# Patient Record
Sex: FEMALE | Race: WHITE | Employment: PART TIME | URBAN - NONMETROPOLITAN AREA
[De-identification: names, ages, dates, MRNs, and addresses within clinical notes are randomized per-mention and may not be internally consistent; named-entity substitution may affect disease eponyms.]

---

## 2023-12-14 ENCOUNTER — OFFICE VISIT (OUTPATIENT)
Dept: UROLOGY | Age: 63
End: 2023-12-14
Payer: COMMERCIAL

## 2023-12-14 VITALS — WEIGHT: 190 LBS | HEIGHT: 63 IN | RESPIRATION RATE: 16 BRPM | BODY MASS INDEX: 33.66 KG/M2

## 2023-12-14 DIAGNOSIS — N20.0 KIDNEY STONES: Primary | ICD-10-CM

## 2023-12-14 LAB
BILIRUBIN URINE: NEGATIVE
BLOOD URINE, POC: NEGATIVE
CHARACTER, URINE: CLEAR
COLOR, URINE: ABNORMAL
GLUCOSE URINE: NEGATIVE MG/DL
KETONES, URINE: NEGATIVE
LEUKOCYTE CLUMPS, URINE: NEGATIVE
NITRITE, URINE: NEGATIVE
PH, URINE: 5.5 (ref 5–9)
PROTEIN, URINE: NEGATIVE MG/DL
SPECIFIC GRAVITY, URINE: 1.02 (ref 1–1.03)
UROBILINOGEN, URINE: 0.2 EU/DL (ref 0–1)

## 2023-12-14 PROCEDURE — 81003 URINALYSIS AUTO W/O SCOPE: CPT | Performed by: NURSE PRACTITIONER

## 2023-12-14 PROCEDURE — 99203 OFFICE O/P NEW LOW 30 MIN: CPT | Performed by: NURSE PRACTITIONER

## 2023-12-14 RX ORDER — CETIRIZINE HYDROCHLORIDE 10 MG/1
10 TABLET ORAL NIGHTLY
COMMUNITY

## 2023-12-14 RX ORDER — TOPIRAMATE 50 MG
TABLET ORAL
COMMUNITY
Start: 2022-07-06 | End: 2024-06-28

## 2023-12-14 RX ORDER — CHLORAL HYDRATE 500 MG
CAPSULE ORAL
COMMUNITY

## 2023-12-14 RX ORDER — CALCIUM CARBONATE 500 MG/1
500 TABLET, CHEWABLE ORAL 2 TIMES DAILY
COMMUNITY

## 2023-12-14 RX ORDER — OMEPRAZOLE 40 MG/1
CAPSULE, DELAYED RELEASE ORAL
COMMUNITY

## 2023-12-14 RX ORDER — METOPROLOL TARTRATE 50 MG/1
50 TABLET, FILM COATED ORAL 2 TIMES DAILY
COMMUNITY

## 2023-12-14 NOTE — PROGRESS NOTES
[address] [place] [address]  Dept: [phone]  Loc: [phone]    Visit Date: 12/14/2023        HPI:     Argentina Humphries is a 66 y.o. female who presents today for:  Chief Complaint   Patient presents with    New Patient    Nephrolithiasis       HPI  Pt referred to our office by Nirmala ZAMUDIO for kidney stone. Piedad Da Silva reports a recent episode of LLQ abdominal pain and cramping. Hx of stones. CT imaging performed at outside facility noted acute diverticulitis of the sigmoid colon and L nephrolithiasis without obstructive uropathy. Completed Augmentin twice daily for 10 days yesterday prescribed for possible UTI, pneumonia, and diverticulitis. On topamax for several years time for seizures. Has never required surgery for kidney stones. No dysuria. No gross hematuria  Afebrile. Still with occasional pain in LLQ. None currently. Current Outpatient Medications   Medication Sig Dispense Refill    calcium carbonate (TUMS) 500 MG chewable tablet Take 1 tablet by mouth 2 times daily      cetirizine (ZYRTEC) 10 MG tablet Take 1 tablet by mouth nightly      vitamin D (CHOLECALCIFEROL) 125 MCG (5000 UT) CAPS capsule Take by mouth      metoprolol tartrate (LOPRESSOR) 50 MG tablet Take 1 tablet by mouth 2 times daily      SINGULAIR 10 MG tablet Take by mouth      Omega-3 Fatty Acids (FISH OIL) 1000 MG capsule Take by mouth      omeprazole (PRILOSEC) 40 MG delayed release capsule Take by mouth      Probiotic Product CAPS Take by mouth      TOPAMAX 50 MG tablet Take by mouth      Multiple Vitamin (MULTIVITAMIN ADULT PO) Take by mouth       No current facility-administered medications for this visit. Past Medical History  Piedad Da Silva  has no past medical history on file. Past Surgical History  The patient  has no past surgical history on file.     Family History  This patient's family history is not on

## 2023-12-14 NOTE — PATIENT INSTRUCTIONS
large segment of bowel due to inflammatory bowel disease---you may have excessive oxalate absorption from the gut increasing your stone risk. Depending on the stone composition and or 24 hour urine tests, specific recommendations can be made to help prevent kidney stone formation in the future. Obesity--Kidney stones are more common with obesity. Any weight reduction can be helpful. Do your best!!    Further testing:  A 24 hour urine collection test called a Tiny Figueroa may be ordered by your physician at your follow-up appointment in the office. This test analyzes the elements present in your urine that may increase your risk for kidney stone formation. It allows your medical provider to pinpoint what specific dietary changes or medicine changes can be made to prevent future stones. Stone analysis can be done on stone fragments retrieved during your procedure or from fragments that you pass in your urine to aid in prevention of future stones.

## 2023-12-15 ENCOUNTER — HOSPITAL ENCOUNTER (OUTPATIENT)
Dept: CT IMAGING | Age: 63
Discharge: HOME | End: 2023-12-15
Attending: RADIOLOGY

## 2023-12-15 DIAGNOSIS — Z00.6 ENCOUNTER FOR EXAMINATION FOR NORMAL COMPARISON AND CONTROL IN CLINICAL RESEARCH PROGRAM: ICD-10-CM

## 2024-02-01 ENCOUNTER — OFFICE VISIT (OUTPATIENT)
Dept: UROLOGY | Age: 64
End: 2024-02-01
Payer: COMMERCIAL

## 2024-02-01 VITALS
BODY MASS INDEX: 34.02 KG/M2 | HEIGHT: 63 IN | SYSTOLIC BLOOD PRESSURE: 130 MMHG | DIASTOLIC BLOOD PRESSURE: 82 MMHG | WEIGHT: 192 LBS

## 2024-02-01 DIAGNOSIS — N20.0 KIDNEY STONES: Primary | ICD-10-CM

## 2024-02-01 PROCEDURE — 99213 OFFICE O/P EST LOW 20 MIN: CPT | Performed by: NURSE PRACTITIONER

## 2024-02-01 ASSESSMENT — ENCOUNTER SYMPTOMS
BACK PAIN: 0
ABDOMINAL PAIN: 0
NAUSEA: 0
VOMITING: 0

## 2024-02-01 NOTE — PROGRESS NOTES
fatigue, fever and unexpected weight change.   Gastrointestinal:  Negative for abdominal pain, nausea and vomiting.   Genitourinary:  Negative for decreased urine volume, difficulty urinating, dysuria, flank pain, frequency, hematuria and urgency.   Musculoskeletal:  Negative for back pain.       Objective:   There were no vitals taken for this visit.    Physical Exam  Vitals reviewed.   Constitutional:       General: She is not in acute distress.     Appearance: Normal appearance. She is well-developed. She is not ill-appearing or diaphoretic.   HENT:      Head: Normocephalic and atraumatic.      Right Ear: External ear normal.      Left Ear: External ear normal.      Nose: Nose normal.      Mouth/Throat:      Mouth: Mucous membranes are moist.   Eyes:      General: No scleral icterus.        Right eye: No discharge.         Left eye: No discharge.   Neck:      Vascular: No JVD.      Trachea: No tracheal deviation.   Pulmonary:      Effort: Pulmonary effort is normal. No respiratory distress.   Abdominal:      General: There is no distension.      Tenderness: There is no abdominal tenderness. There is no right CVA tenderness or left CVA tenderness.   Musculoskeletal:         General: No tenderness. Normal range of motion.   Neurological:      Mental Status: She is alert and oriented to person, place, and time. Mental status is at baseline.   Psychiatric:         Mood and Affect: Mood normal.         Behavior: Behavior normal.         Thought Content: Thought content normal.         POC  No results found for this visit on 02/01/24.      Patients recent PSA values are as follows  No results found for: \"PSA\", \"PSADIA\"     Recent BUN/Creatinine:  No results found for: \"BUN\", \"CREATININE\"      Assessment:   L nonobstructive nephrolithiasis  Hx kidney stones  Recent diverticulitis  Hx seiures    Plan:     Reviewed litholink results with Sadaf.  She is doing a great job on fluid intake.  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

## 2024-02-01 NOTE — PATIENT INSTRUCTIONS
Citrate was low.  Citrate is a molecule in blood and urine that binds to calcium.  When citrate binds to calcium in the urine, it acts like a shield by preventing calcium from binding with oxalate or phosphate.  This shield will prevent the patient from making more kidney stones.  Having low citrate levels in the urine means the patient does not have this natural shield and is more likely to form new kidney stones.  Patient should increase citrate consumption by drinking orange juice, lemonade, or diluted lemon juice.  One good and inexpensive way to achieve this goal is by mixing 4 ounces of lemon juice in 2 liters of water to be used in a 24-hour period.  Could also trial true lemon packets.      Pt should maintain a moderate calcium intake of 800-1200 mg of calcium per day from diet intake and not from supplements.  Too little calcium and too much calcium in the diet can both contribute to stone formation.   Try to reduce sodium and keep animal protein less than 9 ozs per day.     Foods and drinks with calcium   Food Calcium, milligrams   Milk (skim, 2 percent, or whole, 8 oz [240 mL]) 300   Yogurt (6 oz [168 g]) 250   Orange juice (with calcium, 8 oz [240 mL]) 300   Tofu with calcium (1/2 cup [113 g]) 435   Cheese (1 oz [28 g]) 195 to 335 (hard cheese = higher calcium)   Cottage cheese (1/2 cup [113 g]) 130   Ice cream or frozen yogurt (1/2 cup [113 g]) 100   Soy milk (8 oz [240 mL]) 300   Beans (1/2 cup cooked [113 g]) 60 to 80   Dark, leafy green vegetables (1/2 cup cooked [113 g]) 50 to 135   Almonds (24 whole) 70   Orange (1 medium) 60

## 2024-07-10 ENCOUNTER — HOSPITAL ENCOUNTER (OUTPATIENT)
Dept: GENERAL RADIOLOGY | Age: 64
Discharge: HOME | End: 2024-07-10

## 2024-07-10 DIAGNOSIS — Z00.6 EXAMINATION FOR NORMAL COMPARISON FOR CLINICAL RESEARCH: ICD-10-CM

## 2024-07-12 ENCOUNTER — OFFICE VISIT (OUTPATIENT)
Dept: UROLOGY | Age: 64
End: 2024-07-12
Payer: COMMERCIAL

## 2024-07-12 VITALS
HEIGHT: 63 IN | WEIGHT: 188.2 LBS | BODY MASS INDEX: 33.35 KG/M2 | DIASTOLIC BLOOD PRESSURE: 78 MMHG | SYSTOLIC BLOOD PRESSURE: 128 MMHG

## 2024-07-12 DIAGNOSIS — N20.0 KIDNEY STONES: Primary | ICD-10-CM

## 2024-07-12 PROCEDURE — 99214 OFFICE O/P EST MOD 30 MIN: CPT | Performed by: NURSE PRACTITIONER

## 2024-07-12 RX ORDER — ROSUVASTATIN CALCIUM 10 MG/1
10 TABLET, COATED ORAL DAILY
COMMUNITY
Start: 2024-06-17

## 2024-07-12 RX ORDER — MV-MN/C/THEANINE/HERB NO.310 1000-200MG
1 POWDER IN PACKET (EA) ORAL DAILY
COMMUNITY

## 2024-07-12 ASSESSMENT — ENCOUNTER SYMPTOMS
NAUSEA: 0
VOMITING: 0
BACK PAIN: 0
ABDOMINAL PAIN: 0

## 2024-07-12 NOTE — PROGRESS NOTES
[facility] [place] [facility]  [facility] [place] [facility]  [address]  [facility] [address]  Dept: [phone]  Loc: [phone]    Visit Date: 7/12/2024        HPI:     Sadaf Sanz is a 63 y.o. female who presents today for:  Chief Complaint   Patient presents with    Nephrolithiasis     KUB prior       HPI    Pt seen in follow up for kidney stones.       Sadaf has a hx of recurrent kidney stones without any prior stone surgeries.  CT imaging performed 12/2023 with small nonobstructive left nephrolithiasis without obstructive uropathy.  She is on Topamax.  Litholink noted hypercalciuria and low citrate.      Completed KUB prior to appt today.  Denies any dysuria, hematuria, flank pain.  Avoiding gatorade.  Trying to follow kidney stone diet changes      Current Outpatient Medications   Medication Sig Dispense Refill    Misc Natural Products (NEURIVA) CAPS Take 1 capsule by mouth daily      rosuvastatin (CRESTOR) 10 MG tablet Take 1 tablet by mouth daily      calcium carbonate (TUMS) 500 MG chewable tablet Take 1 tablet by mouth daily      SINGULAIR 10 MG tablet Take by mouth      cetirizine (ZYRTEC) 10 MG tablet Take 1 tablet by mouth nightly (Patient not taking: Reported on 7/12/2024)      vitamin D (CHOLECALCIFEROL) 125 MCG (5000 UT) CAPS capsule Take by mouth      metoprolol tartrate (LOPRESSOR) 50 MG tablet Take 1 tablet by mouth 2 times daily      Omega-3 Fatty Acids (FISH OIL) 1000 MG capsule Take by mouth      omeprazole (PRILOSEC) 40 MG delayed release capsule Take by mouth      Probiotic Product CAPS Take by mouth      TOPAMAX 50 MG tablet Take by mouth      Multiple Vitamin (MULTIVITAMIN ADULT PO) Take by mouth       No current facility-administered medications for this visit.       Past Medical History  Sadaf  has no past medical history on file.    Past Surgical History  The patient  has no past surgical history on file.    Family History  This patient's family history is not

## 2025-07-22 DIAGNOSIS — N20.0 KIDNEY STONES: Primary | ICD-10-CM

## 2025-07-26 ENCOUNTER — HOSPITAL ENCOUNTER (OUTPATIENT)
Dept: GENERAL RADIOLOGY | Age: 65
Discharge: HOME | End: 2025-07-26

## 2025-07-26 DIAGNOSIS — Z00.6 EXAMINATION FOR NORMAL COMPARISON OR CONTROL IN CLINICAL RESEARCH: ICD-10-CM

## 2025-07-31 ENCOUNTER — OFFICE VISIT (OUTPATIENT)
Dept: UROLOGY | Age: 65
End: 2025-07-31
Payer: COMMERCIAL

## 2025-07-31 VITALS
WEIGHT: 188.8 LBS | SYSTOLIC BLOOD PRESSURE: 122 MMHG | HEIGHT: 63 IN | DIASTOLIC BLOOD PRESSURE: 62 MMHG | BODY MASS INDEX: 33.45 KG/M2

## 2025-07-31 DIAGNOSIS — N20.0 KIDNEY STONES: Primary | ICD-10-CM

## 2025-07-31 PROCEDURE — 99214 OFFICE O/P EST MOD 30 MIN: CPT | Performed by: NURSE PRACTITIONER

## 2025-07-31 RX ORDER — ATORVASTATIN CALCIUM 10 MG/1
10 TABLET, FILM COATED ORAL DAILY
COMMUNITY
Start: 2025-07-28

## 2025-07-31 RX ORDER — ASPIRIN 81 MG/1
81 TABLET ORAL DAILY
COMMUNITY

## 2025-07-31 ASSESSMENT — ENCOUNTER SYMPTOMS
ABDOMINAL PAIN: 0
VOMITING: 0
NAUSEA: 0
BACK PAIN: 0

## 2025-07-31 NOTE — PROGRESS NOTES
[facility] [place] [facility]  [facility] [place] [facility]  [address]  [facility] [address]  Dept: [phone]  Loc: [phone]    Visit Date: 7/31/2025        HPI:     Sadaf Sanz is a 64 y.o. female who presents today for:  Chief Complaint   Patient presents with    Nephrolithiasis     KUB prior       HPI    Pt seen in follow up for kidney stones.       Sadaf has a hx of recurrent kidney stones without any prior stone surgeries.  CT imaging performed 12/2023 with small nonobstructive left nephrolithiasis without obstructive uropathy.  She is on Topamax.  Litholink noted hypercalciuria and low citrate.       Completed KUB prior to appt today.  Denies any dysuria, hematuria, flank pain.  Trying to avoid gatorade.  Trying to follow kidney stone diet changes.  Does drink more fluid    Current Outpatient Medications   Medication Sig Dispense Refill    atorvastatin (LIPITOR) 10 MG tablet Take 1 tablet by mouth daily      aspirin 81 MG EC tablet Take 1 tablet by mouth daily      Misc Natural Products (NEURIVA) CAPS Take 1 capsule by mouth daily      FIBER PO Take 1 capsule by mouth Daily      MAGNESIUM ASPARTATE PO Take 1 capsule by mouth daily      calcium carbonate (TUMS) 500 MG chewable tablet Take 1 tablet by mouth daily      vitamin D (CHOLECALCIFEROL) 125 MCG (5000 UT) CAPS capsule Take by mouth      metoprolol tartrate (LOPRESSOR) 50 MG tablet Take 1 tablet by mouth 2 times daily      Omega-3 Fatty Acids (FISH OIL) 1000 MG capsule Take by mouth      omeprazole (PRILOSEC) 40 MG delayed release capsule Take by mouth      Probiotic Product CAPS Take by mouth (Patient not taking: Reported on 7/12/2024)      TOPAMAX 50 MG tablet Take by mouth      Multiple Vitamin (MULTIVITAMIN ADULT PO) Take by mouth       No current facility-administered medications for this visit.       Past Medical History  Sadaf  has no past medical history on file.    Past Surgical History  The patient  has no past